# Patient Record
Sex: FEMALE | Race: ASIAN | NOT HISPANIC OR LATINO | URBAN - METROPOLITAN AREA
[De-identification: names, ages, dates, MRNs, and addresses within clinical notes are randomized per-mention and may not be internally consistent; named-entity substitution may affect disease eponyms.]

---

## 2018-06-26 ENCOUNTER — OFFICE VISIT - HEALTHEAST (OUTPATIENT)
Dept: FAMILY MEDICINE | Facility: CLINIC | Age: 18
End: 2018-06-26

## 2018-06-26 DIAGNOSIS — Z23 RABIES, NEED FOR PROPHYLACTIC VACCINATION AGAINST: ICD-10-CM

## 2018-06-26 ASSESSMENT — MIFFLIN-ST. JEOR: SCORE: 1209.48

## 2018-06-29 ENCOUNTER — AMBULATORY - HEALTHEAST (OUTPATIENT)
Dept: NURSING | Facility: CLINIC | Age: 18
End: 2018-06-29

## 2018-06-29 ENCOUNTER — COMMUNICATION - HEALTHEAST (OUTPATIENT)
Dept: FAMILY MEDICINE | Facility: CLINIC | Age: 18
End: 2018-06-29

## 2018-06-29 DIAGNOSIS — Z23 RABIES, NEED FOR PROPHYLACTIC VACCINATION AGAINST: ICD-10-CM

## 2018-06-29 DIAGNOSIS — Z23 NEED FOR TDAP VACCINATION: ICD-10-CM

## 2018-07-03 ENCOUNTER — COMMUNICATION - HEALTHEAST (OUTPATIENT)
Dept: FAMILY MEDICINE | Facility: CLINIC | Age: 18
End: 2018-07-03

## 2018-07-06 ENCOUNTER — AMBULATORY - HEALTHEAST (OUTPATIENT)
Dept: NURSING | Facility: CLINIC | Age: 18
End: 2018-07-06

## 2018-07-06 ENCOUNTER — AMBULATORY - HEALTHEAST (OUTPATIENT)
Dept: FAMILY MEDICINE | Facility: CLINIC | Age: 18
End: 2018-07-06

## 2018-07-06 DIAGNOSIS — Z23 NEED FOR PROPHYLACTIC VACCINATION AGAINST RABIES: ICD-10-CM

## 2018-07-06 DIAGNOSIS — Z23 NEED FOR TDAP VACCINATION: ICD-10-CM

## 2018-07-06 DIAGNOSIS — Z23 RABIES, NEED FOR PROPHYLACTIC VACCINATION AGAINST: ICD-10-CM

## 2018-07-20 ENCOUNTER — AMBULATORY - HEALTHEAST (OUTPATIENT)
Dept: NURSING | Facility: CLINIC | Age: 18
End: 2018-07-20

## 2018-07-20 DIAGNOSIS — Z23 NEED FOR PROPHYLACTIC VACCINATION AGAINST RABIES: ICD-10-CM

## 2018-09-05 ENCOUNTER — COMMUNICATION - HEALTHEAST (OUTPATIENT)
Dept: FAMILY MEDICINE | Facility: CLINIC | Age: 18
End: 2018-09-05

## 2018-09-05 ENCOUNTER — AMBULATORY - HEALTHEAST (OUTPATIENT)
Dept: LAB | Facility: CLINIC | Age: 18
End: 2018-09-05

## 2018-09-05 DIAGNOSIS — Z11.9 SCREENING EXAMINATION FOR INFECTIOUS DISEASE: ICD-10-CM

## 2018-09-17 ENCOUNTER — COMMUNICATION - HEALTHEAST (OUTPATIENT)
Dept: FAMILY MEDICINE | Facility: CLINIC | Age: 18
End: 2018-09-17

## 2018-09-19 LAB — RABV NAB SER NT-ACNC: 11 IU/ML

## 2018-09-24 ENCOUNTER — OFFICE VISIT - HEALTHEAST (OUTPATIENT)
Dept: FAMILY MEDICINE | Facility: CLINIC | Age: 18
End: 2018-09-24

## 2018-09-24 DIAGNOSIS — K21.9 GERD (GASTROESOPHAGEAL REFLUX DISEASE): ICD-10-CM

## 2018-09-24 DIAGNOSIS — Z00.00 ROUTINE GENERAL MEDICAL EXAMINATION AT A HEALTH CARE FACILITY: ICD-10-CM

## 2018-09-24 DIAGNOSIS — R00.2 PALPITATIONS: ICD-10-CM

## 2018-09-24 LAB
ATRIAL RATE - MUSE: 66 BPM
DIASTOLIC BLOOD PRESSURE - MUSE: NORMAL MMHG
INTERPRETATION ECG - MUSE: NORMAL
P AXIS - MUSE: 28 DEGREES
PR INTERVAL - MUSE: 124 MS
QRS DURATION - MUSE: 80 MS
QT - MUSE: 428 MS
QTC - MUSE: 448 MS
R AXIS - MUSE: 58 DEGREES
SYSTOLIC BLOOD PRESSURE - MUSE: NORMAL MMHG
T AXIS - MUSE: 35 DEGREES
VENTRICULAR RATE- MUSE: 66 BPM

## 2018-09-24 ASSESSMENT — MIFFLIN-ST. JEOR: SCORE: 1206.76

## 2018-10-01 ENCOUNTER — AMBULATORY - HEALTHEAST (OUTPATIENT)
Dept: LAB | Facility: CLINIC | Age: 18
End: 2018-10-01

## 2018-10-01 DIAGNOSIS — K21.9 GERD (GASTROESOPHAGEAL REFLUX DISEASE): ICD-10-CM

## 2018-10-04 ENCOUNTER — COMMUNICATION - HEALTHEAST (OUTPATIENT)
Dept: FAMILY MEDICINE | Facility: CLINIC | Age: 18
End: 2018-10-04

## 2018-10-04 LAB
H PYLORI AG STL QL IA: ABNORMAL
REPORT STATUS: ABNORMAL
SPECIMEN DESCRIPTION: ABNORMAL

## 2018-10-05 ENCOUNTER — COMMUNICATION - HEALTHEAST (OUTPATIENT)
Dept: FAMILY MEDICINE | Facility: CLINIC | Age: 18
End: 2018-10-05

## 2018-10-05 ENCOUNTER — AMBULATORY - HEALTHEAST (OUTPATIENT)
Dept: FAMILY MEDICINE | Facility: CLINIC | Age: 18
End: 2018-10-05

## 2018-10-07 ENCOUNTER — COMMUNICATION - HEALTHEAST (OUTPATIENT)
Dept: FAMILY MEDICINE | Facility: CLINIC | Age: 18
End: 2018-10-07

## 2018-10-09 ENCOUNTER — COMMUNICATION - HEALTHEAST (OUTPATIENT)
Dept: FAMILY MEDICINE | Facility: CLINIC | Age: 18
End: 2018-10-09

## 2018-10-22 ENCOUNTER — COMMUNICATION - HEALTHEAST (OUTPATIENT)
Dept: FAMILY MEDICINE | Facility: CLINIC | Age: 18
End: 2018-10-22

## 2019-10-24 ENCOUNTER — OFFICE VISIT - HEALTHEAST (OUTPATIENT)
Dept: FAMILY MEDICINE | Facility: CLINIC | Age: 19
End: 2019-10-24

## 2019-10-24 DIAGNOSIS — Z01.419 WELL FEMALE EXAM WITH ROUTINE GYNECOLOGICAL EXAM: ICD-10-CM

## 2019-10-24 DIAGNOSIS — Z11.3 SCREENING FOR STD (SEXUALLY TRANSMITTED DISEASE): ICD-10-CM

## 2019-10-24 DIAGNOSIS — Z12.4 SCREENING FOR CERVICAL CANCER: ICD-10-CM

## 2019-10-24 DIAGNOSIS — Z23 NEED FOR VACCINATION: ICD-10-CM

## 2019-10-24 ASSESSMENT — MIFFLIN-ST. JEOR: SCORE: 1195.42

## 2019-10-25 LAB
BKR LAB AP ABNORMAL BLEEDING: NO
BKR LAB AP BIRTH CONTROL/HORMONES: NORMAL
BKR LAB AP CERVICAL APPEARANCE: NORMAL
BKR LAB AP GYN ADEQUACY: NORMAL
BKR LAB AP GYN INTERPRETATION: NORMAL
BKR LAB AP HPV REFLEX: NORMAL
BKR LAB AP LMP: NORMAL
BKR LAB AP PATIENT STATUS: NORMAL
BKR LAB AP PREVIOUS ABNORMAL: NO
BKR LAB AP PREVIOUS NORMAL: NORMAL
C TRACH DNA SPEC QL PROBE+SIG AMP: NEGATIVE
HIGH RISK?: NO
N GONORRHOEA DNA SPEC QL NAA+PROBE: NEGATIVE
PATH REPORT.COMMENTS IMP SPEC: NORMAL
RESULT FLAG (HE HISTORICAL CONVERSION): NORMAL

## 2020-04-08 ENCOUNTER — AMBULATORY - HEALTHEAST (OUTPATIENT)
Dept: FAMILY MEDICINE | Facility: CLINIC | Age: 20
End: 2020-04-08

## 2020-04-08 ENCOUNTER — COMMUNICATION - HEALTHEAST (OUTPATIENT)
Dept: CARDIOLOGY | Facility: CLINIC | Age: 20
End: 2020-04-08

## 2020-04-08 ENCOUNTER — OFFICE VISIT - HEALTHEAST (OUTPATIENT)
Dept: FAMILY MEDICINE | Facility: CLINIC | Age: 20
End: 2020-04-08

## 2020-04-08 DIAGNOSIS — R10.12 ABDOMINAL PAIN, LEFT UPPER QUADRANT: ICD-10-CM

## 2020-04-08 LAB
ALBUMIN SERPL-MCNC: 4.5 G/DL (ref 3.5–5)
ALP SERPL-CCNC: 56 U/L (ref 45–120)
ALT SERPL W P-5'-P-CCNC: 12 U/L (ref 0–45)
ANION GAP SERPL CALCULATED.3IONS-SCNC: 9 MMOL/L (ref 5–18)
AST SERPL W P-5'-P-CCNC: 16 U/L (ref 0–40)
BASOPHILS # BLD AUTO: 0 THOU/UL (ref 0–0.2)
BASOPHILS NFR BLD AUTO: 1 % (ref 0–2)
BILIRUB SERPL-MCNC: 0.7 MG/DL (ref 0–1)
BUN SERPL-MCNC: 7 MG/DL (ref 8–22)
CALCIUM SERPL-MCNC: 10.8 MG/DL (ref 8.5–10.5)
CHLORIDE BLD-SCNC: 101 MMOL/L (ref 98–107)
CO2 SERPL-SCNC: 28 MMOL/L (ref 22–31)
CREAT SERPL-MCNC: 0.67 MG/DL (ref 0.6–1.1)
EOSINOPHIL # BLD AUTO: 0.1 THOU/UL (ref 0–0.4)
EOSINOPHIL NFR BLD AUTO: 1 % (ref 0–6)
ERYTHROCYTE [DISTWIDTH] IN BLOOD BY AUTOMATED COUNT: 11.8 % (ref 11–14.5)
GFR SERPL CREATININE-BSD FRML MDRD: >60 ML/MIN/1.73M2
GLUCOSE BLD-MCNC: 83 MG/DL (ref 70–125)
HCT VFR BLD AUTO: 42.2 % (ref 35–47)
HGB BLD-MCNC: 14.4 G/DL (ref 12–16)
LIPASE SERPL-CCNC: 17 U/L (ref 0–52)
LYMPHOCYTES # BLD AUTO: 1.6 THOU/UL (ref 0.8–4.4)
LYMPHOCYTES NFR BLD AUTO: 21 % (ref 20–40)
MCH RBC QN AUTO: 30.5 PG (ref 27–34)
MCHC RBC AUTO-ENTMCNC: 34.2 G/DL (ref 32–36)
MCV RBC AUTO: 89 FL (ref 80–100)
MONOCYTES # BLD AUTO: 0.5 THOU/UL (ref 0–0.9)
MONOCYTES NFR BLD AUTO: 6 % (ref 2–10)
NEUTROPHILS # BLD AUTO: 5.4 THOU/UL (ref 2–7.7)
NEUTROPHILS NFR BLD AUTO: 71 % (ref 50–70)
PLATELET # BLD AUTO: 262 THOU/UL (ref 140–440)
PMV BLD AUTO: 8.7 FL (ref 7–10)
POTASSIUM BLD-SCNC: 3.7 MMOL/L (ref 3.5–5)
PROT SERPL-MCNC: 7.8 G/DL (ref 6–8)
RBC # BLD AUTO: 4.73 MILL/UL (ref 3.8–5.4)
SODIUM SERPL-SCNC: 138 MMOL/L (ref 136–145)
WBC: 7.5 THOU/UL (ref 4–11)

## 2020-04-09 ENCOUNTER — COMMUNICATION - HEALTHEAST (OUTPATIENT)
Dept: FAMILY MEDICINE | Facility: CLINIC | Age: 20
End: 2020-04-09

## 2020-04-10 ENCOUNTER — AMBULATORY - HEALTHEAST (OUTPATIENT)
Dept: LAB | Facility: CLINIC | Age: 20
End: 2020-04-10

## 2020-04-10 DIAGNOSIS — R10.12 ABDOMINAL PAIN, LEFT UPPER QUADRANT: ICD-10-CM

## 2020-04-14 ENCOUNTER — COMMUNICATION - HEALTHEAST (OUTPATIENT)
Dept: FAMILY MEDICINE | Facility: CLINIC | Age: 20
End: 2020-04-14

## 2020-04-14 LAB — H PYLORI AG STL QL IA: POSITIVE

## 2020-04-30 ENCOUNTER — COMMUNICATION - HEALTHEAST (OUTPATIENT)
Dept: FAMILY MEDICINE | Facility: CLINIC | Age: 20
End: 2020-04-30

## 2020-04-30 DIAGNOSIS — R10.12 ABDOMINAL PAIN, LEFT UPPER QUADRANT: ICD-10-CM

## 2020-05-22 ENCOUNTER — COMMUNICATION - HEALTHEAST (OUTPATIENT)
Dept: FAMILY MEDICINE | Facility: CLINIC | Age: 20
End: 2020-05-22

## 2020-05-22 DIAGNOSIS — R10.12 ABDOMINAL PAIN, LEFT UPPER QUADRANT: ICD-10-CM

## 2020-06-13 ENCOUNTER — COMMUNICATION - HEALTHEAST (OUTPATIENT)
Dept: FAMILY MEDICINE | Facility: CLINIC | Age: 20
End: 2020-06-13

## 2020-06-13 DIAGNOSIS — R10.12 ABDOMINAL PAIN, LEFT UPPER QUADRANT: ICD-10-CM

## 2020-06-17 ENCOUNTER — COMMUNICATION - HEALTHEAST (OUTPATIENT)
Dept: FAMILY MEDICINE | Facility: CLINIC | Age: 20
End: 2020-06-17

## 2020-06-25 ENCOUNTER — COMMUNICATION - HEALTHEAST (OUTPATIENT)
Dept: FAMILY MEDICINE | Facility: CLINIC | Age: 20
End: 2020-06-25

## 2020-06-30 ENCOUNTER — OFFICE VISIT - HEALTHEAST (OUTPATIENT)
Dept: FAMILY MEDICINE | Facility: CLINIC | Age: 20
End: 2020-06-30

## 2020-06-30 DIAGNOSIS — B96.81 GASTRITIS, HELICOBACTER PYLORI: ICD-10-CM

## 2020-06-30 DIAGNOSIS — K29.70 GASTRITIS, HELICOBACTER PYLORI: ICD-10-CM

## 2020-07-06 ENCOUNTER — AMBULATORY - HEALTHEAST (OUTPATIENT)
Dept: LAB | Facility: CLINIC | Age: 20
End: 2020-07-06

## 2020-07-06 DIAGNOSIS — B96.81 GASTRITIS, HELICOBACTER PYLORI: ICD-10-CM

## 2020-07-06 DIAGNOSIS — K29.70 GASTRITIS, HELICOBACTER PYLORI: ICD-10-CM

## 2020-07-07 ENCOUNTER — COMMUNICATION - HEALTHEAST (OUTPATIENT)
Dept: FAMILY MEDICINE | Facility: CLINIC | Age: 20
End: 2020-07-07

## 2020-07-07 LAB — H PYLORI AG STL QL IA: POSITIVE

## 2020-10-16 ENCOUNTER — OFFICE VISIT - HEALTHEAST (OUTPATIENT)
Dept: FAMILY MEDICINE | Facility: CLINIC | Age: 20
End: 2020-10-16

## 2020-10-16 ENCOUNTER — COMMUNICATION - HEALTHEAST (OUTPATIENT)
Dept: FAMILY MEDICINE | Facility: CLINIC | Age: 20
End: 2020-10-16

## 2020-10-16 DIAGNOSIS — Z13.220 SCREENING FOR HYPERLIPIDEMIA: ICD-10-CM

## 2020-10-16 DIAGNOSIS — Z13.1 SCREENING FOR DIABETES MELLITUS: ICD-10-CM

## 2020-10-16 DIAGNOSIS — Z01.419 WELL FEMALE EXAM WITH ROUTINE GYNECOLOGICAL EXAM: ICD-10-CM

## 2020-10-16 DIAGNOSIS — Z13.0 SCREENING FOR DEFICIENCY ANEMIA: ICD-10-CM

## 2020-10-16 DIAGNOSIS — N76.0 BV (BACTERIAL VAGINOSIS): ICD-10-CM

## 2020-10-16 DIAGNOSIS — N89.8 VAGINAL DISCHARGE: ICD-10-CM

## 2020-10-16 DIAGNOSIS — Z20.3 EXPOSURE TO RABIES: ICD-10-CM

## 2020-10-16 DIAGNOSIS — Z11.1 SCREENING EXAMINATION FOR PULMONARY TUBERCULOSIS: ICD-10-CM

## 2020-10-16 DIAGNOSIS — B96.89 BV (BACTERIAL VAGINOSIS): ICD-10-CM

## 2020-10-16 LAB
CHOLEST SERPL-MCNC: 102 MG/DL
CLUE CELLS: ABNORMAL
FASTING STATUS PATIENT QL REPORTED: NORMAL
FASTING STATUS PATIENT QL REPORTED: NORMAL
GLUCOSE BLD-MCNC: 73 MG/DL (ref 70–125)
HDLC SERPL-MCNC: 60 MG/DL
HGB BLD-MCNC: 14.8 G/DL (ref 12–16)
LDLC SERPL CALC-MCNC: 34 MG/DL
TRICHOMONAS, WET PREP: ABNORMAL
TRIGL SERPL-MCNC: 41 MG/DL
YEAST, WET PREP: ABNORMAL

## 2020-10-16 ASSESSMENT — PATIENT HEALTH QUESTIONNAIRE - PHQ9: SUM OF ALL RESPONSES TO PHQ QUESTIONS 1-9: 3

## 2020-10-16 ASSESSMENT — MIFFLIN-ST. JEOR: SCORE: 1222.51

## 2020-10-21 LAB
GAMMA INTERFERON BACKGROUND BLD IA-ACNC: 0.07 IU/ML
M TB IFN-G BLD-IMP: NEGATIVE
MITOGEN IGNF BCKGRD COR BLD-ACNC: -0.01 IU/ML
MITOGEN IGNF BCKGRD COR BLD-ACNC: -0.01 IU/ML
QTF INTERPRETATION: NORMAL
QTF MITOGEN - NIL: 6.99 IU/ML

## 2020-11-09 LAB — RABV NAB SER NT-ACNC: 0.6 IU/ML

## 2020-11-18 ENCOUNTER — OFFICE VISIT - HEALTHEAST (OUTPATIENT)
Dept: FAMILY MEDICINE | Facility: CLINIC | Age: 20
End: 2020-11-18

## 2020-11-18 DIAGNOSIS — K12.0 APHTHOUS ULCER: ICD-10-CM

## 2020-12-03 ENCOUNTER — OFFICE VISIT - HEALTHEAST (OUTPATIENT)
Dept: FAMILY MEDICINE | Facility: CLINIC | Age: 20
End: 2020-12-03

## 2020-12-03 DIAGNOSIS — B37.31 YEAST INFECTION OF THE VAGINA: ICD-10-CM

## 2020-12-03 DIAGNOSIS — N89.8 VAGINAL DISCHARGE: ICD-10-CM

## 2020-12-03 LAB
CLUE CELLS: ABNORMAL
TRICHOMONAS, WET PREP: ABNORMAL
YEAST, WET PREP: ABNORMAL

## 2020-12-08 LAB
C TRACH DNA SPEC QL PROBE+SIG AMP: NEGATIVE
N GONORRHOEA DNA SPEC QL NAA+PROBE: NEGATIVE

## 2021-05-27 ASSESSMENT — PATIENT HEALTH QUESTIONNAIRE - PHQ9: SUM OF ALL RESPONSES TO PHQ QUESTIONS 1-9: 3

## 2021-06-01 VITALS — HEIGHT: 63 IN | WEIGHT: 107 LBS | BODY MASS INDEX: 18.96 KG/M2

## 2021-06-02 VITALS — WEIGHT: 106.4 LBS | BODY MASS INDEX: 18.85 KG/M2 | HEIGHT: 63 IN

## 2021-06-02 NOTE — PROGRESS NOTES
Assessment/ Plan:      1. Well female exam with routine gynecological exam  norgestimate-ethinyl estradiol (ORTHO-CYCLEN) 0.25-35 mg-mcg per tablet   2. Screening for STD (sexually transmitted disease)  Chlamydia trachomatis & Neisseria gonorrhoeae, Amplified Detection   3. Screening for cervical cancer  Gynecologic Cytology (PAP Smear)   4. Need for vaccination  HPV vaccine 9 valent 3 dose IM     Healthy female exam completed today. Discussed lifestyle modifications including weight management, eating a balanced diet and getting regular cardiovascular exercise. Discussed self breast exams and how to complete these. Pap Smear updated today.  Screen for STIs completed.  Second HPV given.  Plan yearly annual physicals or sooner as needed.       Subjective:      Rosita Quintanilla is a 19 y.o. female who presents for an annual exam. The patient is sexually active. The patient participates in regular exercise: yes. The patient reports that there is not domestic violence in her life. Thinking about different contraception. She is wanting to start on the daily OCP.  She denies any history of migraine with aura, blood clotting or clotting disorders in herself or her family, bleeding disorders in herself or her family.  She is not obese and she does not smoke cigarettes.    Healthy Habits:   Regular Exercise: Yes  Sunscreen Use: Yes  Healthy Diet: Yes  Dental Visits Regularly: No  Seat Belt: Yes  Sexually active: Yes  Self Breast Exam Monthly:No  Hemoccults: No  Flex Sig: No  Colonoscopy: No  Lipid Profile: No  Glucose Screen: No  Prevention of Osteoporosis: N/A  Last Dexa: No  Guns at Home:  No      Immunization History   Administered Date(s) Administered     HPV 9 Valent 09/24/2018     Influenza,seasonal,quad inj =/> 6months 09/24/2018     Rabies, IM 06/29/2018     Rabies, IM Human Diploid 07/06/2018, 07/20/2018     Tdap 07/06/2018     Immunization status: missing doses of HPV.    No exam data present    Gynecologic  History  Patient's last menstrual period was 10/14/2019 (exact date).  Contraception: condoms  Last Pap: n/a. Results were: n/a  Last mammogram: n/a. Results were: n/a      OB History   No obstetric history on file.       No current outpatient medications on file.     No current facility-administered medications for this visit.      No past medical history on file.  No past surgical history on file.  Patient has no known allergies.  Family History   Problem Relation Age of Onset     Cancer Paternal Uncle      Alzheimer's disease Maternal Grandmother      Heart disease Paternal Uncle         valve replacement     Kidney disease Neg Hx      Hypertension Neg Hx      Diabetes Neg Hx      Social History     Socioeconomic History     Marital status: Single     Spouse name: Not on file     Number of children: Not on file     Years of education: Not on file     Highest education level: Not on file   Occupational History     Not on file   Social Needs     Financial resource strain: Not on file     Food insecurity:     Worry: Not on file     Inability: Not on file     Transportation needs:     Medical: Not on file     Non-medical: Not on file   Tobacco Use     Smoking status: Never Smoker     Smokeless tobacco: Never Used   Substance and Sexual Activity     Alcohol use: Yes     Comment: occasionally     Drug use: No     Sexual activity: Not Currently   Lifestyle     Physical activity:     Days per week: Not on file     Minutes per session: Not on file     Stress: Not on file   Relationships     Social connections:     Talks on phone: Not on file     Gets together: Not on file     Attends Sabianism service: Not on file     Active member of club or organization: Not on file     Attends meetings of clubs or organizations: Not on file     Relationship status: Not on file     Intimate partner violence:     Fear of current or ex partner: Not on file     Emotionally abused: Not on file     Physically abused: Not on file     Forced  "sexual activity: Not on file   Other Topics Concern     Not on file   Social History Narrative    She is originally from [place].  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; works at coffee shop       Review of Systems  General:  Denies problem  Eyes: Denies problem  Ears/Nose/Throat: Denies problem  Cardiovascular: Denies problem  Respiratory:  Denies problem  Gastrointestinal:  Denies problem  Genitourinary: Denies problem  Musculoskeletal:  Denies problem  Skin: Denies problem  Neurologic: Denies problem  Psychiatric: Denies problem  Endocrine: Denies problem  Heme/Lymphatic: Denies problem   Allergic/Immunologic: Denies problem        Objective:         Vitals:    10/24/19 1557   BP: 102/58   Pulse: 64   Resp: 16   Temp: 98  F (36.7  C)   TempSrc: Oral   Weight: 103 lb 14.4 oz (47.1 kg)   Height: 5' 3\" (1.6 m)     Body mass index is 18.41 kg/m .    Physical Exam:  General Appearance: Alert, cooperative, no distress, appears stated age  Head: Normocephalic, without obvious abnormality, atraumatic  Eyes: PERRL, conjunctiva/corneas clear, EOM's intact  Ears: Normal TM's and external ear canals, both ears  Nose: Nares normal, septum midline,mucosa normal, no drainage  Throat: Lips, mucosa, and tongue normal; teeth and gums normal  Neck: Supple, symmetrical, trachea midline, no adenopathy;  thyroid: not enlarged, symmetric, no tenderness/mass/nodules; no carotid bruit or JVD  Back: Symmetric, no curvature, ROM normal, no CVA tenderness  Lungs: Clear to auscultation bilaterally, respirations unlabored  Breasts: No breast masses, tenderness, asymmetry, or nipple discharge.  Heart: Regular rate and rhythm, S1 and S2 normal, no murmur, rub, or gallop  Abdomen: Soft, non-tender, bowel sounds active all four quadrants,  no masses, no organomegaly  Pelvic:Normally developed genitalia with no external lesions or eruptions. Vagina and cervix show no lesions, inflammation, discharge or tenderness. No cystocele, No rectocele. " Uterus non-tender, mobile.  No adnexal mass or tenderness.  Extremities: Extremities normal, atraumatic, no cyanosis or edema  Skin: Skin color, texture, turgor normal, no rashes or lesions  Lymph nodes: Cervical, supraclavicular, and axillary nodes normal  Neurologic: Normal

## 2021-06-03 VITALS
HEART RATE: 64 BPM | HEIGHT: 63 IN | SYSTOLIC BLOOD PRESSURE: 102 MMHG | BODY MASS INDEX: 18.41 KG/M2 | RESPIRATION RATE: 16 BRPM | DIASTOLIC BLOOD PRESSURE: 58 MMHG | WEIGHT: 103.9 LBS | TEMPERATURE: 98 F

## 2021-06-05 VITALS
HEIGHT: 63 IN | HEART RATE: 80 BPM | DIASTOLIC BLOOD PRESSURE: 62 MMHG | BODY MASS INDEX: 19.31 KG/M2 | WEIGHT: 109 LBS | SYSTOLIC BLOOD PRESSURE: 100 MMHG | TEMPERATURE: 98.3 F | OXYGEN SATURATION: 99 %

## 2021-06-05 VITALS
WEIGHT: 112 LBS | HEART RATE: 72 BPM | SYSTOLIC BLOOD PRESSURE: 108 MMHG | DIASTOLIC BLOOD PRESSURE: 62 MMHG | BODY MASS INDEX: 19.68 KG/M2

## 2021-06-07 NOTE — TELEPHONE ENCOUNTER
Caller states abdominal pain last 2 days.  Relates history of H.pylori infection and that this feels like that again.  Last occurrence was 1 year ago seen by Dr. Knight for dx then.    Pain located in upper abdomen over stomach, rates highest intensity is 4/10.    Took 5 tums last night before bed which resolved pain, woke at 530am with pain again.     This AM she took leftover abx amoxicillin from her previous wisdom tooth surgery.    Caller relayed she had some discomfort over past 3 months and noticed about an 8lb weight loss.  Thinks possibly due to stress as well.    Relates not sexually active and no concern of preg.    I explained option for [facility] or to do telephone with provider - would like to try for provider encounter today.  Explained s/s to call back - handed off to scheduling.    Haim Reeves RN  Clinical Trials Nurse

## 2021-06-07 NOTE — PROGRESS NOTES
"Rosita Quintanilla is a 20 y.o. female who is being evaluated via a billable telephone visit.      The patient has been notified of following:     \"This telephone visit will be conducted via a call between you and your physician/provider. We have found that certain health care needs can be provided without the need for a physical exam.  This service lets us provide the care you need with a short phone conversation.  If a prescription is necessary we can send it directly to your pharmacy.  If lab work is needed we can place an order for that and you can then stop by our lab to have the test done at a later time.    Telephone visits are billed at different rates depending on your insurance coverage. During this emergency period, for some insurers they may be billed the same as an in-person visit.  Please reach out to your insurance provider with any questions.    If during the course of the call the physician/provider feels a telephone visit is not appropriate, you will not be charged for this service.\"    Patient has given verbal consent to a Telephone visit? Yes    Rosita Quintanilla complains of    Chief Complaint   Patient presents with     Abdominal Pain     started this past week, pain has been intermitten. Soft BM today, yellowish in color.  Ate ok yesterday, today no able to eat-having gag reflex. Painful to bend over.  Patient stated she 1 tablet of Amoxicillin left over from wisdom teeth being and took the tablet at 5:30am-due to abdominal pain.     Weight Loss     in the last few months, not trying to lose weight.       I have reviewed and updated the patient's Past Medical History, Social History, Family History and Medication List.  Phone visit done because of the ongoing coronavirus(Covid 19) outbreak, and a \"stay at home \"order by [facility].    ALLERGIES  Patient has no known allergies.    Additional provider notes: Abdominal pain, she is localizing that in the left upper quadrant, reminded her her H. pylori " pain that she had about a year ago, at that time she was treated with triple therapy.  Denies any fever chills, nausea vomiting.  Pain is described as mild, between 2-5 on a pain scale, she took Tums x5 few days ago followed by 1 amoxicillin leftover, pain that seems to improve.  Denies any blood in her stool, but had a small soft yellow stool, nonbloody.  Unintentional weight loss for the past several months, she is usually around 108 pounds, current weight is about 101 pounds.  Menstrual period has been regular, no chance of pregnancy.  She is a .    Rosita was seen today for abdominal pain and weight loss.    Diagnoses and all orders for this visit:    Abdominal pain, left upper quadrant  -     HM1(CBC and Differential); Future  -     Comprehensive Metabolic Panel; Future  -     Lipase; Future  -     H. pylori Antigen, Stool(HPSAG); Future  -     omeprazole (PRILOSEC) 20 MG capsule; Take 1 capsule (20 mg total) by mouth daily before breakfast.    Abdominal pain, she is localizing it in the left upper quadrant, differential diagnosis discussed.  Gastritis etc., diagnostic and treatment options discussed, will get an H. pylori testing in the stool, with additional blood works, in the meantime she will continue with Tums as needed and omeprazole daily, further recommendation based on test results, could consider imaging, worsening abdominal pain discussed and she was instructed to seek prompt medical attention at the ER.      Phone call duration:  25 minutes    Valeriano Mcadams MD

## 2021-06-08 NOTE — TELEPHONE ENCOUNTER
Refill Approved    Rx renewed per Medication Renewal Policy. Medication was last renewed on 5/22/20.    Obdulia Russo, [facility] Connection Triage/Med Refill 6/15/2020     Requested Prescriptions   Pending Prescriptions Disp Refills     omeprazole (PRILOSEC) 20 MG capsule [Pharmacy Med Name: OMEPRAZOLE DR 20 MG CAPSULE] 30 capsule 0     Sig: TAKE 1 CAPSULE (20 MG TOTAL) BY MOUTH DAILY BEFORE BREAKFAST.       GI Medications Refill Protocol Passed - 6/13/2020  1:32 PM        Passed - PCP or prescribing provider visit in last 12 or next 3 months.     Last office visit with prescriber/PCP: Visit date not found OR same dept: Visit date not found OR same specialty: 6/26/2018 Nicole Geiger CNP  Last physical: Visit date not found Last MTM visit: Visit date not found   Next visit within 3 mo: Visit date not found  Next physical within 3 mo: Visit date not found  Prescriber OR PCP: Valeriano Mcadams MD  Last diagnosis associated with med order: 1. Abdominal pain, left upper quadrant  - omeprazole (PRILOSEC) 20 MG capsule [Pharmacy Med Name: OMEPRAZOLE DR 20 MG CAPSULE]; TAKE 1 CAPSULE (20 MG TOTAL) BY MOUTH DAILY BEFORE BREAKFAST.  Dispense: 30 capsule; Refill: 0    If protocol passes may refill for 12 months if within 3 months of last provider visit (or a total of 15 months).

## 2021-06-08 NOTE — TELEPHONE ENCOUNTER
Dr. Mcadams patient saw you 4/28/2020. This is a [facility] patient are you willing to prescribe or okay to send it to [facility] location.

## 2021-06-09 NOTE — TELEPHONE ENCOUNTER
Left message #2 at [phone] for patient to call clinic to schedule appt with Dr. Mcadams.  Sending letter out and postponing task out to 2 weeks and will try again if an appointment hasn't been made.

## 2021-06-09 NOTE — TELEPHONE ENCOUNTER
Called and left message#1 for patient to return call to clinic to schedule. Okay to schedule on call back

## 2021-06-09 NOTE — PROGRESS NOTES
"Rosita Quintanilla is a 20 y.o. female who is being evaluated via a billable telephone visit.      The patient has been notified of following:     \"This telephone visit will be conducted via a call between you and your physician/provider. We have found that certain health care needs can be provided without the need for a physical exam.  This service lets us provide the care you need with a short phone conversation.  If a prescription is necessary we can send it directly to your pharmacy.  If lab work is needed we can place an order for that and you can then stop by our lab to have the test done at a later time.    Telephone visits are billed at different rates depending on your insurance coverage. During this emergency period, for some insurers they may be billed the same as an in-person visit.  Please reach out to your insurance provider with any questions.    If during the course of the call the physician/provider feels a telephone visit is not appropriate, you will not be charged for this service.\"    Patient has given verbal consent to a Telephone visit? Yes    What phone number would you like to be contacted at? [phone]    Patient would like to receive their AVS by AVS Preference: Mail a copy.    Additional provider notes: H. pylori gastritis plus improvement abdominal pain, stomach spasm, he was treated with triple therapy, tolerated well, she is here for follow-up overall symptoms improved, denies any nausea, denies any vomiting, no change in bowel movement.  Still taking omeprazole with plan to slowly taper off.  [facility], she does mention history of metal allergies, asking if copper of urination.    Assessment/Plan:  1. Gastritis, Helicobacter pylori  - H. pylori Antigen, Stool(HPSAG); Future    H. pylori gastritis has been treated with quadruple therapy, she responded well with test of cure any time in the near future.  Slowly taper off omeprazole continue healthy lifestyle " changes and follow-up as needed.  History of metal allergy, less likely will experience copper allergies from IUD, continue to watch for symptoms after IUD  Insertion.    Phone call duration:  12 minutes    Valeriano Mcadams MD

## 2021-06-12 NOTE — PROGRESS NOTES
Assessment/ Plan:      1. Well female exam with routine gynecological exam     2. Vaginal discharge  Wet Prep, Vaginal   3. Exposure to rabies  Rabies Antibody Endpoint   4. Screening examination for pulmonary tuberculosis  QTF-Mycobacterium tuberculosis by QuantiFERON-TB Gold Plus   5. Screening for hyperlipidemia  Lipid [place] FASTING   6. Screening for diabetes mellitus  Glucose   7. Screening for deficiency anemia  Hemoglobin     Healthy female exam completed today.   Increased vaginal discharge noted by patient.  Will do wet prep today.  She will be notified if this does show any significant abnormalities.  She will be treated as appropriate.    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    She would like to screen for possible tuberculosis to see if she has latent TB and be treated if needed.  Discussed with patient that if her QuantiFERON gold comes back positive that I would refer her to the [facility] and further management would be completed through them.    Discussed lifestyle modifications including weight loss, eating a balanced diet and getting regular cardiovascular exercise. Discussed self breast exams and how to complete these. Pap Smear reviewed and is up to date. Plan yearly annual physicals or sooner as needed.       Subjective:      Rosita Quintanilla is a 20 y.o. female who presents for an annual exam. The patient is sexually active. The patient participates in regular exercise: no. The patient reports that there is not domestic violence in her life. She reports that she would like her IUD strings checked today. She also mentions some 'yellowish' vaginal discharge. She denies any odor. She reports that it has been about a week. She had her IUD placed in July. She just stopped spotting after this insertion[facility]     Healthy Habits:   Regular Exercise: No  Sunscreen Use: Yes  Healthy Diet: Yes  Dental  Visits Regularly: No  Seat Belt: Yes  Sexually active: Yes  Self Breast Exam Monthly:Yes  Hemoccults: No  Flex Sig: No  Colonoscopy: No  Lipid Profile: No  Glucose Screen: No  Prevention of Osteoporosis: No  Last Dexa: No  Guns at Home:  No      Immunization History   Administered Date(s) Administered     HPV 9 Valent 09/24/2018, 10/24/2019     INFLUENZA,SEASONAL QUAD, PF, =/> 6months 09/24/2019     Influenza,seasonal,quad inj =/> 6months 09/24/2018     Rabies, IM 06/29/2018     Rabies, IM Human Diploid 07/06/2018, 07/20/2018     Tdap 07/06/2018     Immunization status: due for HPV    No exam data present    Gynecologic History  No LMP recorded. (Menstrual status: Contraception).  Contraception: IUD  Last Pap: 10/2019. Results were: normal  Last mammogram: n/a. Results were: n/a      OB History   No obstetric history on file.       Current Outpatient Medications   Medication Sig Dispense Refill     omeprazole (PRILOSEC) 20 MG capsule TAKE 1 CAPSULE (20 MG TOTAL) BY MOUTH DAILY BEFORE BREAKFAST. 90 capsule 3     No current facility-administered medications for this visit.      No past medical history on file.  No past surgical history on file.  Patient has no known allergies.  Family History   Problem Relation Age of Onset     Cancer Paternal Uncle      Alzheimer's disease Maternal Grandmother      Heart disease Paternal Uncle         valve replacement     Kidney disease Neg Hx      Hypertension Neg Hx      Diabetes Neg Hx      Social History     Socioeconomic History     Marital status: Single     Spouse name: Not on file     Number of children: Not on file     Years of education: Not on file     Highest education level: Not on file   Occupational History     Not on file   Social Needs     Financial resource strain: Not on file     Food insecurity     Worry: Not on file     Inability: Not on file     Transportation needs     Medical: Not on file     Non-medical: Not on file   Tobacco Use     Smoking status: Never  "Smoker     Smokeless tobacco: Never Used   Substance and Sexual Activity     Alcohol use: Yes     Comment: occasionally     Drug use: No     Sexual activity: Not Currently   Lifestyle     Physical activity     Days per week: Not on file     Minutes per session: Not on file     Stress: Not on file   Relationships     Social connections     Talks on phone: Not on file     Gets together: Not on file     Attends Scientologist service: Not on file     Active member of club or organization: Not on file     Attends meetings of clubs or organizations: Not on file     Relationship status: Not on file     Intimate partner violence     Fear of current or ex partner: Not on file     Emotionally abused: Not on file     Physically abused: Not on file     Forced sexual activity: Not on file   Other Topics Concern     Not on file   Social History Narrative    She is originally from [place].  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   Musculoskeletal:  Denies problem  Skin: Denies problem  Neurologic: Denies problem  Psychiatric: Denies problem  Endocrine: Denies problem  Heme/Lymphatic: Denies problem   Allergic/Immunologic: Denies problem        Objective:         Vitals:    10/16/20 1023   BP: 100/62   Pulse: 80   Temp: 98.3  F (36.8  C)   TempSrc: Oral   SpO2: 99%   Weight: 109 lb (49.4 kg)   Height: 5' 3.25\" (1.607 m)     Body mass index is 19.16 kg/m .    Physical Exam:  General Appearance: Alert, cooperative, no distress, appears stated age  Head: Normocephalic, without obvious abnormality, atraumatic  Eyes: PERRL, conjunctiva/corneas clear, EOM's intact  Ears: Normal TM's and external ear canals, both ears  Nose: Nares normal, " septum midline,mucosa normal, no drainage  Throat: Lips, mucosa, and tongue normal; teeth and gums normal  Neck: Supple, symmetrical, trachea midline, no adenopathy;  thyroid: not enlarged, symmetric, no tenderness/mass/nodules; no carotid bruit or JVD  Back: Symmetric, no curvature, ROM normal, no CVA tenderness  Lungs: Clear to auscultation bilaterally, respirations unlabored  Breasts: No breast masses, tenderness, asymmetry, or nipple discharge.  Heart: Regular rate and rhythm, S1 and S2 normal, no murmur, rub, or gallop  Abdomen: Soft, non-tender, bowel sounds active all four quadrants,  no masses, no organomegaly  Pelvic:Normally developed genitalia with no external lesions or eruptions. Vagina and cervix show no lesions, inflammation, or tenderness.  Vaginal discharge is present yellow and mildly clumpy.  IUD strings visualized at cervical os. No cystocele, No rectocele. Uterus non-tender.   No adnexal mass or tenderness.  Extremities: Extremities normal, atraumatic, no cyanosis or edema  Skin: Skin color, texture, turgor normal, no rashes or lesions  Lymph nodes: Cervical, supraclavicular, and axillary nodes normal  Neurologic: Normal

## 2021-06-13 NOTE — PROGRESS NOTES
Assessment/ Plan:     1. Vaginal discharge  Wet Prep, Vaginal    Chlamydia trachomatis & Neisseria gonorrhoeae, Amplified Detection   2. Yeast infection of the vagina     Etiology of vaginal discharge unclear. Labs ordered as above. Will treat appropriately pending results.    Addendum: Wet prep positive for yeast. Recommend miconazole 7 day treatment. MyChart message sent to patient as requested.     Follow up if symptoms worsen or do not resolve.    Subjective:      Rosita Quintanilla is a 20 y.o. female who presents for evaluation of vaginal discharge and odor. She was treated 10/16/20 for bacterial vaginosis with 7 days of metronidazole but feels that her BV never fully resolved. She reports for the last two weeks her vaginal discharge has increased and is opaque white in color. She also endorses a fishy odor that has been present with the discharged but noticeably worsened yesterday. She denies pain, itching, and dysuria. She is sexually active with the same partner for the last 6 months.     The following portions of the patient's history were reviewed and updated as appropriate: allergies, current medications and past medical history.        Current Outpatient Medications   Medication Sig     omeprazole (PRILOSEC) 20 MG capsule TAKE 1 CAPSULE (20 MG TOTAL) BY MOUTH DAILY BEFORE BREAKFAST.       Review of Systems   Constitutional: negative  Genitourinary:positive for vaginal discharge and odor, see HPI. Denies pelvic pain, vaginal bleeding, dysuria, itching, lesions.     Objective:      /62   Pulse 72   Wt 112 lb (50.8 kg)   BMI 19.68 kg/m      General appearance: alert, appears stated age and cooperative  Pelvic: Creamy white and yellow vaginal/cervical discharge present.

## 2021-06-13 NOTE — PROGRESS NOTES
Provider E-Visit time total (minutes): 5      Assessment:   The encounter diagnosis was Aphthous ulcer.     Plan:   No medications were ordered this encounter    There are no Patient Instructions on file for this visit.  Return for further follow up if needed. Call [phone]) or schedule an appointment via [facility]..    Subjective:   Rosita Quintanilla is a 20 y.o. female who submitted an eVisit request for evaluation of her No chief complaint on file..  See the questionnaire and message section of encounter report for information related to history of present illness and review of systems.    The following portions of the patient's history were reviewed and updated as appropriate:  She does not have any pertinent problems on file.  She has No Known Allergies..     Objective:   No exam performed today, patient submitted as eVisit.

## 2021-06-16 PROBLEM — N89.8 VAGINAL DISCHARGE: Status: ACTIVE | Noted: 2020-12-03

## 2021-06-18 NOTE — PROGRESS NOTES
Assessment/Plan:       1. Rabies, need for prophylactic vaccination against  Orders placed for her to receive the rabies vaccine.  She will return to the clinic on Thursday to receive the vaccination.  She will then return on day 7 to receive the second vaccine and again in 21-28 days to receive the third vaccine.  I provided patient today with the vaccine information sheet so she can review the recommendations.  In addition she will bring in her immunization record to update in epic.  All other questions were answered for the patient today.  She will follow-up as needed for annual physicals.  - Rabies vaccine IM; Standing        Subjective:      Rosita Quintanilla is a 18 y.o. 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  She was recently accepted into the  program at the [employer] and will be starting this fall.  This summer she is volunteering at the [facility] and is requiring pre-exposure prophylaxis rabies vaccination series.  She believes that she is up-to-date on the remainder of her vaccinations and will be able to bring in her record when she comes in for her first rabies vaccine.  She reports when she immigrated to the  from [place] she received multiple vaccines she is just not sure which ones.    The following portions of the patient's history were reviewed and updated as appropriate: allergies, current medications, past family history, past medical history, past social history, past surgical history and problem list.    History reviewed. No pertinent past medical history.     History reviewed. No pertinent surgical history.     Social History     Social History     Marital status: Single     Spouse name: N/A     Number of children: N/A     Years of education: N/A     Occupational History     Not on file.     Social History Main Topics     Smoking status: Never Smoker     Smokeless tobacco: Never Used     Alcohol use Yes      Comment:  "occasionally     Drug use: No     Sexual activity: Not on file     Other Topics Concern     Not on file     Social History Narrative     No narrative on file     No current outpatient prescriptions on file.     Review of Systems   Pertinent items are noted in HPI.      Objective:      /70  Pulse 68  Resp 16  Ht 5' 3\" (1.6 m)  Wt 107 lb (48.5 kg)  BMI 18.95 kg/m2    General appearance: alert, appears stated age and cooperative  Head: Normocephalic, without obvious abnormality, atraumatic  Lungs: clear to auscultation bilaterally  Heart: regular rate and rhythm, S1, S2 normal, no murmur, click, rub or gallop  Neurologic: Grossly normal  "

## 2021-06-20 NOTE — LETTER
Letter by Nicole Geiger CNP at      Author: Nicole Geiger CNP Service: -- Author Type: --    Filed:  Encounter Date: 6/25/2020 Status: (Other)         [facility]  [address]  [facility] [address]      June 25, 2020      Dear [facility],    As a valued [facility] patient, your healthcare needs are our priority.  Your health care team has determined that you are due for an appointment regarding your medication follow up.    To help prevent delays in your care, please call the [facility] at [phone].    We look forward to partnering with you to achieve optimal health and wellbeing.    Sincerely,  Your care team at [facility]

## 2021-06-20 NOTE — PROGRESS NOTES
Assessment:      Healthy female exam.     1. Routine general medical examination at a health care facility  HPV vaccine 9 valent 3 dose IM    Influenza, Seasonal,Quad Inj, 36+ MOS (multi-dose vial)   2. GERD (gastroesophageal reflux disease)  H. pylori Antigen, Stool(HPSAG)   3. Palpitations  Electrocardiogram Perform and Read        Plan:     1. Routine general medical examination at a health care facility    - HPV vaccine 9 valent 3 dose IM  - Influenza, Seasonal,Quad Inj, 36+ MOS (multi-dose vial)    2. GERD (gastroesophageal reflux disease)  Given location of symptoms and history, is possible that this is due to GERD.  Would recommend testing for H pylori as well.  Discussed test with patient.  For now, can also begin omeprazole 20 mg daily for 1 month, would like her to follow-up at that time.  Discussed foods to avoid to see if this helps her symptoms.  - H. pylori Antigen, Stool(HPSAG); Future    3. Palpitations  Reviewed EKG results with patient at visit today.  Patient's symptoms are fairly intermittent at this time, so I do not think would necessarily capture her rhythm while symptomatic with a Holter monitor, or even an event monitor, however they do last for an extended period of time, so certainly said that if her symptoms recur, patient should be seen in clinic or walking care immediately so she could be evaluated while she is symptomatic.  If they become more frequent or last longer, needs to be seen in clinic immediately.  This may be associated with anxiety as well too.    - Electrocardiogram Perform and Read        Subjective:      Rosita Quintanilla is a 18 y.o. female who presents for an annual exam. The patient is not currently sexually active. The patient participates in regular exercise: no. The patient reports that there is not domestic violence in her life. Additional concerns:     Abdominal symptoms: In 8th grade, would eat little for breakfast, then few months later, she would randomly have some  cramping in epigastric pain.  She would eat food and pain would go away, then pain would come back.  There are times when eating doesn't help.  During high school, would happen every 2-3 weeks, last 1-2 weeks.  After high school, there was less stress, symptoms less frequent, about once every few months, noting epigastric pain.  NO N/V.  No diarrhea or constipation.  No fevers or known weight changes.  Sometimes has decreased appetite, because of pain.  Has taken some liquid/powder, but doesn't recall what it is, got it in [place].   [place]oved to [place] in March 2017, so hasn't taken medication.  Drinks 1 cup of coffee per day.  Occasional alcohol use every few months.  No spicy foods.  Has some ketchup otherwise no significant acidic food intake.  No NSAID use.      She has also noted palpitations periodically, a few times in 12th grade.  Last time it happened was about 2 months ago, however, when it occurs, it tends to last several hours until she falls asleep, then when she wakes up the next morning and symptoms resolve.  Feels nervous and anxious when it happens, but no associating symptoms such as shortness of breath, diaphoresis, nausea or vomiting.  No known family history of heart disease aside from paternal uncle who had valve replacement surgery.      Healthy Habits:   Regular Exercise: No  Sunscreen Use: Yes  Healthy Diet: Yes  Dental Visits Regularly: No  Seat Belt: Yes  Sexually active: not currently  Self Breast Exam Monthly:N/A  Hemoccults: N/A  Flex Sig: N/A  Colonoscopy: N/A  Lipid Profile: N/A  Glucose Screen: N/A  Prevention of Osteoporosis: N/A  Last Dexa: N/A        Immunization History   Administered Date(s) Administered     HPV 9 Valent 09/24/2018     Influenza, seasonal,quad inj 36+ mos 09/24/2018     Rabies, IM 06/29/2018     Rabies, IM Human Diploid 07/06/2018, 07/20/2018     Tdap 07/06/2018     Immunization status: due today.    No exam data present    Gynecologic History  No LMP  "recorded.  Contraception: abstinence  Last Pap: n/a.     OB History   No data available       Current Outpatient Prescriptions   Medication Sig Dispense Refill     omeprazole (PRILOSEC) 20 MG capsule Take 1 capsule (20 mg total) by mouth daily before breakfast. 30 capsule 1     No current facility-administered medications for this visit.      History reviewed. No pertinent past medical history.  History reviewed. No pertinent surgical history.  Review of patient's allergies indicates no known allergies.  Family History   Problem Relation Age of Onset     Cancer Paternal Uncle      Alzheimer's disease Maternal Grandmother      Heart disease Paternal Uncle      valve replacement     Kidney disease Neg Hx      Hypertension Neg Hx      Diabetes Neg Hx      Social History     Social History     Marital status: Single     Spouse name: N/A     Number of children: N/A     Years of education: N/A     Occupational History     Not on file.     Social History Main Topics     Smoking status: Never Smoker     Smokeless tobacco: Never Used     Alcohol use Yes      Comment: occasionally     Drug use: No     Sexual activity: Not Currently     Other Topics Concern     Not on file     Social History Narrative    She is originally from [facility] [place] [facility].  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; works at coffee shop       Review of Systems  General:  Denies problem  Eyes: Denies problem  Ears/Nose/Throat: Denies problem  Cardiovascular: Denies problem  Respiratory:  Denies problem  Gastrointestinal:  Denies problem  Genitourinary: Denies problem  Musculoskeletal:  Denies problem  Skin: Denies problem  Neurologic: Denies problem  Psychiatric: Denies problem  Endocrine: Denies problem  Heme/Lymphatic: Denies problem   Allergic/Immunologic: Denies problem        Objective:         Vitals:    09/24/18 1451   BP: 92/64   Pulse: 74   Resp: 16   Weight: 106 lb 6.4 oz (48.3 kg)   Height: 5' 3\" (1.6 m)     Body mass index is 18.85 kg/(m^2).    Physical " Exam:  General Appearance: Alert, cooperative, no distress, appears stated age  Head: Normocephalic, without obvious abnormality, atraumatic  Eyes: PERRL, conjunctiva/corneas clear, EOM's intact  Ears: Normal TM's and external ear canals, both ears  Nose: Nares normal, septum midline,mucosa normal, no drainage  Throat: Lips, mucosa, and tongue normal; teeth and gums normal  Neck: Supple, symmetrical, trachea midline, no adenopathy;  thyroid: not enlarged, symmetric, no tenderness/mass/nodules;   Back: Symmetric, no curvature, ROM normal, no CVA tenderness  Lungs: Clear to auscultation bilaterally, respirations unlabored  Breasts: No breast masses, tenderness, asymmetry, or nipple discharge.  Heart: Regular rate and rhythm, no murmur.   Abdomen: Soft, non-tender, bowel sounds active all four quadrants,  no masses, no organomegaly  Pelvic:Not examined  Extremities: Extremities normal, atraumatic, no cyanosis or edema  Skin: Skin color, texture, turgor normal, no rashes or lesions  Lymph nodes: Cervical, supraclavicular, and axillary nodes normal  Neurologic: Alert.   Psych: Normal affect.  Does not appear anxious or depressed.     EKG: by my interpretation, NSR.      Mary Beth Knight MD  9/24/2018

## 2021-06-26 ENCOUNTER — HEALTH MAINTENANCE LETTER (OUTPATIENT)
Age: 21
End: 2021-06-26

## 2021-10-16 ENCOUNTER — HEALTH MAINTENANCE LETTER (OUTPATIENT)
Age: 21
End: 2021-10-16

## 2021-12-11 ENCOUNTER — HEALTH MAINTENANCE LETTER (OUTPATIENT)
Age: 21
End: 2021-12-11

## 2022-10-01 ENCOUNTER — HEALTH MAINTENANCE LETTER (OUTPATIENT)
Age: 22
End: 2022-10-01

## 2023-02-05 ENCOUNTER — HEALTH MAINTENANCE LETTER (OUTPATIENT)
Age: 23
End: 2023-02-05

## 2024-03-03 ENCOUNTER — HEALTH MAINTENANCE LETTER (OUTPATIENT)
Age: 24
End: 2024-03-03